# Patient Record
Sex: FEMALE | Race: WHITE | Employment: UNEMPLOYED | URBAN - METROPOLITAN AREA
[De-identification: names, ages, dates, MRNs, and addresses within clinical notes are randomized per-mention and may not be internally consistent; named-entity substitution may affect disease eponyms.]

---

## 2022-11-10 ENCOUNTER — HOSPITAL ENCOUNTER (OUTPATIENT)
Dept: PHYSICAL THERAPY | Age: 37
Discharge: HOME | End: 2022-11-10
Payer: COMMERCIAL

## 2022-11-10 PROCEDURE — 97530 THERAPEUTIC ACTIVITIES: CPT

## 2022-11-10 PROCEDURE — 97161 PT EVAL LOW COMPLEX 20 MIN: CPT

## 2022-11-10 PROCEDURE — 97110 THERAPEUTIC EXERCISES: CPT

## 2022-11-10 PROCEDURE — 97535 SELF CARE MNGMENT TRAINING: CPT

## 2022-11-10 NOTE — PROGRESS NOTES
PF Daily Treatment Note  Patient Name: Alon Feliciano  Date:11/10/2022  [x]  Patient  Verified  Insurance:Payor: Jaison Romero / Plan: Brigitte Santa / Product Type: MERRY /   In time:835am  Out time:925am  Total Treatment Time (min): 50  Visit #: 1 of 8    reatment Area: [x] Pelvic Floor     [] Other:  SUBJECTIVE  Pain Level (0-10 scale): 0  Any medication changes, allergies to medications, adverse drug reactions, diagnosis change, or new procedure performed?: [x] No    [] Yes (see summary sheet for update)  Childbirth or pelvic/urogenital surgical history: childbirth x 4, pregnancy x 5; surrogate to twins and to recent baby included in this; bariatric sleeve present  Urine/feces/gas leakage?: only stress UI sx  Any difficulty with external or internal medical exams?: no    Sexually active?: yes  Pain with initial or deep penetration/before or after intercourse? Sometimes with deep thrust.   Any loss of sensation with sexual health?: no  Leakage mainly with vigorous activity (running prior to pregnancy), jumping, coughing, sneezing, positional changes. Prior to this pregnancy, had some leakage with cough/sneeze prior last pregnancy. Had twins in 2020 both vaginal with one breach and had leakage begin following this. Last vaginal delivery 6 weeks ago 2022 (surrogate). Has two children 15and 6years old. Had minor internal tearing with son and it got infected but healed after that. Bariatric surgery in May of 2017 had bariatric sleeve. 2 DNC's. Pmh 2 knee scopes, has had elbow surgery; no pain in the back or hips; Has period cramping sometimes but hasn't restarted period yet. Has some cervical tenderness with sexual intercourse. Nursing/pumping currently. Some days will have leakage consistently on a daily basis and sometimes it's once a day. Wants to do [employer] exercises which include high impact exercises for weight loss. Will be doing a practice 5K with her daughter next week.      Some perineal soreness following biking. Pelvic Floor Dysfunction Evaluation    Musculoskeletal Screen:    Skin Integrity:  [x] Healthy [] Red  [] Labia Atrophy [] Fragile    Sensation: [x] Intact [] Diminished:    Muscle Bulk: [x] Symmetrical  [] Well-developed [] Atrophied:  []L   []R   []B    Prolapse: No [] Cystocele:   [] Rectocele:    PERF Score (Performance/Endurance/Repetitions/Flicks)   P: 4 E: 10 R: 8 F: 10 Total:    Patient has failed previous pelvic floor muscle training? [] Yes    [x] No    Objective:    Current urinary complaint  leaks with activity (stress induced)  stress incontinence    Bladder complaint longevity:  1 - 3 years    Bladder symptom progression:  worsened    Frequency of UI: 1 times per day    Pad use:  none, chooses not to wear anything but changes clothes    Pad wetness when changed:  wet sometimes eraser-head and sometimes half dollar amount of wetness that will make her want to change underwear. Daytime urinary frequency:  Every 8-12 hour(s) during the day    Nocturia:   0-1x/ night    Patient has failed previous pelvic floor muscle training? [] Yes    [x] No    Pelvic floor MMT  4 - full contraction, bilaterally equal  PERF (Performance/Endurance/Repetitions//Flicks): 3/47/6//36, some problems with coordination of bulge and relax. Pain complaint:  Location:none    Pain scale:  Verbal Analog scale: average daily pain 0, best day 0, worst pain 0    Pelvic floor manual exam: Performed via internal vaginal assessment  Mild to moderate tenderness to palpation of the deep transverse perineal B and the right sided iliococcygeus and obturator internus.     Introitus restriction/TTP (reported as hands on a clock): no    Deep PFM Tenderness/Restriction:     Right Left   pubococcygeus mild  \"feels like tapping\"   Ischiococcygeus      Iliococcygeus Moderate, tapping  pressure   Obturator Internus mild  \"feels like tapping\"   coccygeus      ischiocavernosus     bulbospongiosus     Transverse perineal Moderate tender Moderate tender                      FOTO= 51/100     Global Core Strength/stability  Testing deferred to NV. Hip ROM:   Hip strength: flexion,  ext,  abd,  ER,  IR  Transverse abdominus activation: fair (endurance/bulging/diastasis?)  Functional squat:  DAVID   Hip Thrust   Hip Scour   Supine to sit SIJ test   OBJECTIVE  15 min EVALUATION    10 min Therapeutic Exercise:  [] See flow sheet : Instruction on relaxation diaphragm breathing and on bearing down exercises between kegels. []  Pelvic floor strengthening                 [x]  Pelvic floor downtraining  []  Quality pelvic floor contractions       [x]  Relaxation techniques  []  Urge suppression exercises  []  Other:     15 min Therapeutic Activity:  []  See flow sheet : pt education regarding diaphragm breathing and relaxation breathing, bladder irritants, and effects of high impact activities on leakage. Rationale: improve coordination  to improve the patients ability to perform exercise with reduced leakage. 10 min Self care/home management:  []  See flow sheet : Pt education on exercises that stabilize the pelvic floor versus high impact activities that may aggravate leakage sx. Discussed exercise routines, biking, and exercise videos. Advised use of [employer] to reduce pain with sexual intercourse. Rationale: increase strength and improve coordination  to improve the patients ability to manage ADLs with reduced leakage. min Patient Education: [x] Review HEP    [] Progressed/Changed HEP based on:   [] positioning   [] body mechanics   [] transfers   [] heat/ice application        Pain Level (0-10 scale) post treatment: 0    ASSESSMENT/Changes in Function: Pt is a 15-year-old woman presenting to the clinic with c/o stress UI occurring with vigorous activity, cough, sneeze, laugh, and at times with positional changes.  This leakage is inconsistent, with some weeks having daily leakage and other weeks having days without leakage. Pt is an active individual and would like to resume weight loss exercise videos that include high impact activities. She also participates in cycling, which causes discomfort along her pelvic floor and perineum. Pt is 6 weeks post-partum and has been sexually active without complications since 4 days post-partum. She has some pain with sexual intercourse with deep penetration. Pt scored 4/10/8//10 on PERF indicating dec pelvic floor mm strength and endurance, with some difficulty noted with coordination of ava versus bearing down. Patient scored 51 on FOTO indicating 49% functional impairment. Patient can benefit from PT to increase pelvic floor muscle strength, decrease urinary urgency and incontinence, as well as improve hip/core strength, ROM, and dynamic stability for improved functional mobility and QOL. Treatment may include, but is not limited to, therapeutic exercise, therapeutic activities, neuromuscular re-education, manual therapy, and modalities as indicated including biofeedback at therapist discretion. Patient will continue to benefit from skilled PT services to modify and progress therapeutic interventions, address functional mobility deficits, address ROM deficits, address strength deficits, analyze and address soft tissue restrictions, analyze and cue movement patterns, analyze and modify body mechanics/ergonomics, assess and modify postural abnormalities, address imbalance/dizziness, and instruct in home and community integration to attain remaining goals. [x]  See Plan of Care    Progress towards goals / Updated goals:  Short Term Goals: To be accomplished in 1 weeks:  1. Patient will perform pelvic floor exercises 5x/day to maximize therapeutic outcomes. Eval: issued and reviewed  Long Term Goals: To be accomplished in 8-12 treatments:  Patient will demonstrate 4/10/10/10 on PERF for improved pelvic floor strength and to reduce leakage. Eval: 4/10/8//10  2.  Patient will report independence with HEP to maximize therapeutic outcomes. Eval: issued and reviewed  3. Patient will report 50% improvement in urinary incontinence to reduce discomfort with ADLs. Eval: n/a  4. Pt will reduce incontinence pad usage to 1 pantyliner or less per day to improve comfort with daily routine. Eval: sometimes does not wear one; sometimes changes underwear  5. Pt will participate in 30 minute HIIT class without leakage, indicating improved pelvic floor endurance for improved comfort with activity. Eval: leakage with activity. PLAN  []  Upgrade activities as tolerated     [x]  Continue plan of care  []  Update interventions per flow sheet       []  Discharge due to:_  []  Other:_      NEXT VISIT: assess hip strength, mobility, and flexibility. Assign perineal stretching internally and externally.      Christine Deluca, GRACIA 11/10/2022  8:35 AM

## 2022-11-10 NOTE — PROGRESS NOTES
[facility]  [place] [address], [address]  [phone] fax    Plan of Care/ Statement of Necessity for Physical Therapy Services    Patient name: Ki Henry Start of Care: 11/10/2022   Referral source: Melinda Mayen, Malorie Fuller : 1985    Medical Diagnosis: Pelvic floor dysfunction [M62.89]  Payor: OPTIMA / Plan: Chapo Deluca / Product Type: MERRY /  Onset Date:2 years    Treatment Diagnosis: stress UI   Prior Hospitalization: see medical history Provider#: [id number]   Medications: Verified on Patient summary List    Comorbidities: allergies, arthritis, BMI > 30, GI dysfunction   Prior Level of Function: had mild stress UI following birth of twins in , no UI prior to this           The Plan of Care and following information is based on the information from the initial evaluation. Assessment/ key information: Pt is a 69-year-old woman presenting to the clinic with c/o stress UI occurring with vigorous activity, cough, sneeze, laugh, and at times with positional changes. This leakage is inconsistent, with some weeks having daily leakage and other weeks having days without leakage. Pt is an active individual and would like to resume weight loss exercise videos that include high impact activities. She also participates in cycling, which causes discomfort along her pelvic floor and perineum. Pt is 6 weeks post-partum and has been sexually active without complications since 4 days post-partum. She has some pain with sexual intercourse with deep penetration. Pt scored 4/10/8//10 on PERF indicating dec pelvic floor mm strength and endurance, with some difficulty noted with coordination of ava versus bearing down. Patient scored 51 on FOTO indicating 49% functional impairment.   Patient can benefit from PT to increase pelvic floor muscle strength, decrease urinary urgency and incontinence, as well as improve hip/core strength, ROM, and dynamic stability for improved functional mobility and QOL. Treatment may include, but is not limited to, therapeutic exercise, therapeutic activities, neuromuscular re-education, manual therapy, and modalities as indicated including biofeedback at therapist discretion. Evaluation Complexity History MEDIUM  Complexity : 1-2 comorbidities / personal factors will impact the outcome/ POC ; Examination MEDIUM Complexity : 3 Standardized tests and measures addressing body structure, function, activity limitation and / or participation in recreation  ;Presentation LOW Complexity : Stable, uncomplicated  ;Clinical Decision Making MEDIUM Complexity : FOTO score of 26-74  Overall Complexity Rating: LOW     Problem List: Pelvic pain/dysfunction, Decreased pelvic floor mm awareness, Decreased pelvic floor mm strength, Use of accessory muscles, Hypertonus of pelvic floor, and Other stress UI    Treatment Plan may include any combination of the following:   Therapeutic exercise, Neuromuscular re-education, Manual therapy, Physical agent/modality, Patient education, and Other therapeutic activities, self care/home management  Patient / Family readiness to learn indicated by: asking questions, trying to perform skills, and interest    Persons(s) to be included in education: patient (P)    Barriers to Learning/Limitations: None    Patient Goal (s): return to exercise; no leaking    Patient Self Reported Health Status: good    Rehabilitation Potential: good    Short Term Goals: To be accomplished in 1 weeks:  1. Patient will perform pelvic floor exercises 5x/day to maximize therapeutic outcomes. Eval: issued and reviewed  Long Term Goals: To be accomplished in 8-12 treatments:  Patient will demonstrate 4/10/10/10 on PERF for improved pelvic floor strength and to reduce leakage. Eval: 4/10/8//10  2. Patient will report independence with HEP to maximize therapeutic outcomes. Eval: issued and reviewed  3.  Patient will report 50% improvement in urinary incontinence to reduce discomfort with ADLs. Eval: n/a  4. Pt will reduce incontinence pad usage to 1 pantyliner or less per day to improve comfort with daily routine. Eval: sometimes does not wear one; sometimes changes underwear  5. Pt will participate in 30 minute HIIT class without leakage, indicating improved pelvic floor endurance for improved comfort with activity. Eval: leakage with activity. Frequency / Duration: Patient to be seen 1 times per week for 8 weeks. Patient/ Caregiver education and instruction: Diagnosis, prognosis, Proper Voiding Habits, Diet, Pain Management, Exercises, and Bladder Retraining      Darrylegretel George, PT 11/10/2022 11:24 AM    ________________________________________________________________________    I certify that the above Therapy Services are being furnished while the patient is under my care. I agree with the treatment plan and certify that this therapy is necessary.     [de-identified] Signature:____________Date:_________TIME:________     LINDA Mora  ** Signature, Date and Time must be completed for valid certification **      Please sign and return to [facility] [address] [place] [address] & [facility]  [address]  [place], [facility] [address]  [phone] fax

## 2022-12-06 ENCOUNTER — HOSPITAL ENCOUNTER (OUTPATIENT)
Dept: PHYSICAL THERAPY | Age: 37
Discharge: HOME | End: 2022-12-06
Payer: COMMERCIAL

## 2022-12-06 PROCEDURE — 97110 THERAPEUTIC EXERCISES: CPT

## 2022-12-06 PROCEDURE — 97112 NEUROMUSCULAR REEDUCATION: CPT

## 2022-12-06 NOTE — PROGRESS NOTES
PF DAILY TREATMENT NOTE [facility] [address]    Patient Name: Daniel Cruz  Date:2022  : 1985  [x]  Patient  Verified  Payor: Darius Owens / Plan: Sun Canales / Product Type: MERRY /    In time:215pm  Out time:302pm  Total Treatment Time (min): 52    Visit #: 2 of 8    Treatment Area: [x] Pelvic Floor     [] Other:    SUBJECTIVE  Pain Level (0-10 scale): 0  Any medication changes, allergies to medications, adverse drug reactions, diagnosis change, or new procedure performed?: [x] No    [] Yes (see summary sheet for update)  Subjective functional status/changes:   [] No changes reported  Reports initially she was doing very well with the breathing and kegels but recently has been busier and forgetting. OBJECTIVE     24 min Therapeutic Exercise:  [] See flow sheet :   [x]  Pelvic floor strengthening                 []  Pelvic floor downtraining  []  Quality pelvic floor contractions       [x]  Relaxation techniques  []  Urge suppression exercises  [x]  Other: abdominal and LE strengthening  Rationale: increase ROM, increase strength, and improve coordination  to improve the patients ability to manage ADLs with improved ease and reduced leakage. 23 min Neuromuscular Re-education:  []  See flow sheet :   [x]  Pelvic floor strengthening                 []  Pelvic floor downtraining  [x]  Quality pelvic floor contractions       []  Relaxation techniques  []  Urge suppression exercises  [x]  Other: abdominal stability activities -- dead bugs, bird dog  Rationale: increase strength and improve coordination  to improve the patients ability to manage ADLs with improved intra-abdominal pressure and reduced leakage.            With   [] TE   [] TA   [] neuro  [] manual   [] other: Patient Education: [x] Review HEP    [] Progressed/Changed HEP based on:   [] positioning   [] body mechanics   [] transfers   [] heat/ice application    [] other:      Other Objective/Functional Measures:   See PN    Pain Level (0-10 scale) post treatment: 0    ASSESSMENT/Changes in Function: see PN    []  Decrease # of leaks   [] No change []  Improving [] Resolved     []  Decrease hypertonus [] No change []  Improving [] Resolved     []  Increase void interval [] No change []  Improving [] Resolved     []  Increase PF strength [] No change []  Improving [] Resolved     []  Increase PF endurance [] No change []  Improving [] Resolved     []  Increase endurance [] No change []  Improving [] Resolved     []  Decrease # of pads [] No change []  Improving [] Resolved     []  Decrease pain [] No change []  Improving [] Resolved     []  Increased coordination [] No change []  Improving [] Resolved     []  Increased Bowel Frequency [] No change []  Improving [] Resolved       Patient will continue to benefit from skilled PT services to modify and progress therapeutic interventions, address functional mobility deficits, address ROM deficits, address strength deficits, analyze and address soft tissue restrictions, analyze and cue movement patterns, analyze and modify body mechanics/ergonomics, and assess and modify postural abnormalities to attain remaining goals. []  See Plan of Care  [x]  See progress note/recertification  []  See Discharge Summary         Progress towards goals / Updated goals:  Short Term Goals: To be accomplished in 1 weeks:  1. Patient will perform pelvic floor exercises 5x/day to maximize therapeutic outcomes. Eval: issued and reviewed  Current: typically 1-2 times a day, having trouble relaxing (12/6/2022)  Long Term Goals: To be accomplished in 8-12 treatments:  Patient will demonstrate 4/10/10/10 on PERF for improved pelvic floor strength and to reduce leakage. Eval: 4/10/8//10  Current: test next visit, 4/10/8//10 at evaluation (12/6/2022)  2. Patient will report independence with HEP to maximize therapeutic outcomes. Eval: issued and reviewed  Current: irregularly performed due to stress and childcare (12/6/2022)  3.  Patient will report 50% improvement in urinary incontinence to reduce discomfort with ADLs. Eval: n/a   Current: initially improved, recently has regressed (12/6/2022)  4. Pt will reduce incontinence pad usage to 1 pantyliner or less per day to improve comfort with daily routine. Eval: sometimes does not wear one; sometimes changes underwear   Current: change underwear instead, but not daily as before. (12/6/2022)  5. Pt will participate in 30 minute HIIT class without leakage, indicating improved pelvic floor endurance for improved comfort with activity. Eval: leakage with activity. Current: had started doing core classes but got COVID and has not yet been back.  (12/6/2022)     PLAN  []  Upgrade activities as tolerated     [x]  Continue plan of care  []  Update interventions per flow sheet       []  Discharge due to:_  []  Other:_      Jhoana Centeno, PT 12/6/2022  2:18 PM    Future Appointments   Date Time Provider Stefan Rubalcava   12/15/2022 10:00 AM Rozelle Perch, PT [facility]   12/20/2022  9:00 AM Rozelle Perch, PT [facility]   1/3/2023 10:00 AM Rozelle Perch, PT [facility]   1/10/2023 10:00 AM Rozelle Perch, PT [facility]   1/20/2023  2:30 PM Rozelle Perch, PT [facility]   1/24/2023 10:00 AM Rozelle Perch, PT [facility]   1/31/2023 10:00 AM Rozelle Perch, PT [facility]

## 2022-12-09 NOTE — PROGRESS NOTES
[facility]  Jose Angel knapp, [address]  [phone] fax    Pelvic Floor Progress Note  Patient name: Fredy Atwood Start of Care: 11/10/2022   Referral source: LINDA Carrera : 1985   Medical/Treatment Diagnosis: Pelvic floor dysfunction [M62.89]  Payor: OPTIMA / Plan: Vimal Carbo / Product Type: MERRY /  Onset Date:2 years     Prior Hospitalization: see medical history Provider#: [id number]   Medications: Verified on Patient Summary List    Comorbidities: allergies, arthritis, BMI > 30, GI dysfunction   Prior Level of Function: had mild stress UI following birth of twins in , no UI prior to this                                Visits from Start of Care: 2    Missed Visits: 1    Established Goals:             Short Term Goals: To be accomplished in 1 weeks:  1. Patient will perform pelvic floor exercises 5x/day to maximize therapeutic outcomes. Eval: issued and reviewed  Current: typically 1-2 times a day, having trouble relaxing   Long Term Goals: To be accomplished in 8-12 treatments:  Patient will demonstrate 4/10/10/10 on PERF for improved pelvic floor strength and to reduce leakage. Eval: 4/10/8//10  Current: test next visit, 4/10/8//10 at evaluation   2. Patient will report independence with HEP to maximize therapeutic outcomes. Eval: issued and reviewed  Current: irregularly performed due to stress and childcare   3. Patient will report 50% improvement in urinary incontinence to reduce discomfort with ADLs. Eval: n/a              Current: initially improved, recently has regressed   4. Pt will reduce incontinence pad usage to 1 pantyliner or less per day to improve comfort with daily routine. Eval: sometimes does not wear one; sometimes changes underwear              Current: change underwear instead, but not daily as before.    5. Pt will participate in 30 minute HIIT class without leakage, indicating improved pelvic floor endurance for improved comfort with activity. Eval: leakage with activity. Current: had started doing core classes but got COVID and has not yet been back. Key Functional Changes: limited; 1 follow-up only    Updated Goals: to be achieved in 4 weeks:  Patient will demonstrate 4/10/10/10 on PERF for improved pelvic floor strength and to reduce leakage. PN: test next visit, 4/10/8//10 at evaluation   2. Patient will report independence with HEP to maximize therapeutic outcomes. PN: irregularly performed due to stress and childcare   3. Patient will report 50% improvement in urinary incontinence to reduce discomfort with ADLs. PN: initially improved, recently has regressed   4. Pt will reduce incontinence pad usage to 1 pantyliner or less per day to improve comfort with daily routine. PN: change underwear instead, but not daily as before. 5. Pt will participate in 30 minute HIIT class without leakage, indicating improved pelvic floor endurance for improved comfort with activity. PN: had started doing core classes but got COVID and has not yet been back. ASSESSMENT/RECOMMENDATIONS:  Pt has returned for her first follow-up since her [facility] and re-assessment. She has been performing her kegel exercises and working on lifestyle changes such as taking a break from high impact exercises. She has been noticing some positive improvement in leakage that varies by the week. Recommend continued PFPT to increase pelvic floor muscle strength, decrease urinary urgency and incontinence, as well as improve hip/core strength, ROM, and dynamic stability for improved functional mobility and QOL.   [x]Continue therapy per initial plan/protocol at a frequency of  1 x per week for 4-8 weeks  []Continue therapy with the following recommended changes:_____________________ _____________________________________________________________________  []Discontinue therapy progressing towards or have reached established goals  []Discontinue therapy due to lack of appreciable progress towards goals  []Discontinue therapy due to lack of attendance or compliance  []Await Physician's recommendations/decisions regarding therapy  []Other:________________________________________________________________    Thank you for this referral.   Alexus Garcia, PT 12/9/2022 3:51 PM    NOTE TO PHYSICIAN:  PLEASE COMPLETE THE ORDERS BELOW AND  FAX TO [facility]: [phone]  If you are unable to process this request in 24 hours please contact our office: [phone]    I have read the above report and request that my patient continue as recommended. I have read the above report and request that my patient continue therapy with the following changes/special instructions:___________________________________________________________  I have read the above report and request that my patient be discharged from therapy.     [de-identified] Signature:____________Date:_________TIME:________     LINDA Chanel  ** Signature, Date and Time must be completed for valid certification **

## 2022-12-15 ENCOUNTER — HOSPITAL ENCOUNTER (OUTPATIENT)
Dept: PHYSICAL THERAPY | Age: 37
Discharge: HOME | End: 2022-12-15
Payer: COMMERCIAL

## 2022-12-15 PROCEDURE — 97112 NEUROMUSCULAR REEDUCATION: CPT

## 2022-12-15 PROCEDURE — 97110 THERAPEUTIC EXERCISES: CPT

## 2022-12-15 PROCEDURE — 97530 THERAPEUTIC ACTIVITIES: CPT

## 2022-12-15 NOTE — PROGRESS NOTES
PF DAILY TREATMENT NOTE [facility] 3-16    Patient Name: Denver Lepe  Date:12/15/2022  : 1985  [x]  Patient  Verified  Payor: Twan Davison / Plan: Sun Canales / Product Type: MERRY /    In time:10am  Out time:11am  Total Treatment Time (min): 60    Visit #: 1 of 4-8    Treatment Area: [x] Pelvic Floor     [] Other:    SUBJECTIVE  Pain Level (0-10 scale): 0  Any medication changes, allergies to medications, adverse drug reactions, diagnosis change, or new procedure performed?: [x] No    [] Yes (see summary sheet for update)  Subjective functional status/changes:   [] No changes reported  [place] are going [employer] tanisha doesn't use sound notifications which makes it harder to track. Exercises a little more regularly. Leakage occurring after toileting when washing hands ~ 1 drop feeling like \"air bubble. \"    OBJECTIVE     25 min Therapeutic Exercise:  [] See flow sheet :   [x]  Pelvic floor strengthening                 []  Pelvic floor downtraining  [x]  Quality pelvic floor contractions       []  Relaxation techniques  [x]  Urge suppression exercises  [x]  Other: stress UI pressure management in standing, sitting, squatting, QP  Rationale: increase strength and improve coordination  to improve the patients ability to perform ADLs with reduced leakage. 10 min Therapeutic Activity:  []  See flow sheet : training for toileting with full empty, included pressure to lower abdomen, leaning forward, pelvic rocking and rotating, and full STS followed by sit and deep breath. [x]  Increase Tissue extensibility        []  Assess fiber intake    [x]  Assess voiding habits  []  Assess bowel habits  []  Other:   Rationale: improve coordination  to improve the patients ability to toilet with reduced leakage when washing her hands.      25 min Neuromuscular Re-education:  [x]  See flow sheet : reformer core strengthening, pelvic tilt marching and dead bugs   [x]  Pelvic floor strengthening                 []  Pelvic floor downtraining  []  Quality pelvic floor contractions       []  Relaxation techniques  []  Urge suppression exercises  [x]  Other: abdominal strengthening. Rationale: increase strength, improve coordination, and increase proprioception  to improve the patients ability to perform ADLs with reduced leakage with activity. With   [] TE   [] TA   [] neuro  [] manual   [] other: Patient Education: [x] Review HEP    [] Progressed/Changed HEP based on:   [] positioning   [] body mechanics   [] transfers   [] heat/ice application    [] other:      Other Objective/Functional Measures:   Exercises per flowsheet    Pain Level (0-10 scale) post treatment: 0    ASSESSMENT/Changes in Function: Session focused on anterior strengthening and pressure management with kegels, working on breathing and kegels. Plan to emphasize exhale prior to STS and exhale prior to squat with kegel to promote improved pelvic floor coordination. Progressed HEP to include more core exercises upon pt request. Tactile cues provided at ASIS to promote TrA draw in versus rectus/oblique bracing.     []  Decrease # of leaks   [] No change []  Improving [] Resolved     []  Decrease hypertonus [] No change []  Improving [] Resolved     []  Increase void interval [] No change []  Improving [] Resolved     []  Increase PF strength [] No change []  Improving [] Resolved     []  Increase PF endurance [] No change []  Improving [] Resolved     []  Increase endurance [] No change []  Improving [] Resolved     []  Decrease # of pads [] No change []  Improving [] Resolved     []  Decrease pain [] No change []  Improving [] Resolved     []  Increased coordination [] No change []  Improving [] Resolved     []  Increased Bowel Frequency [] No change []  Improving [] Resolved       Patient will continue to benefit from skilled PT services to modify and progress therapeutic interventions, address functional mobility deficits, address ROM deficits, address strength deficits, analyze and address soft tissue restrictions, analyze and cue movement patterns, analyze and modify body mechanics/ergonomics, assess and modify postural abnormalities, address imbalance/dizziness, and instruct in home and community integration to attain remaining goals. []  See Plan of Care  []  See progress note/recertification  []  See Discharge Summary         Progress towards goals / Updated goals:  Patient will demonstrate 4/10/10/10 on PERF for improved pelvic floor strength and to reduce leakage. PN: test next visit, 4/10/8//10 at evaluation   2. Patient will report independence with HEP to maximize therapeutic outcomes. PN: irregularly performed due to stress and childcare   3. Patient will report 50% improvement in urinary incontinence to reduce discomfort with ADLs. PN: initially improved, recently has regressed    Current: cough had leakage, and one instance of leakage 1 minute after using the bathroom. (12/15/2022)  4. Pt will reduce incontinence pad usage to 1 pantyliner or less per day to improve comfort with daily routine. PN: change underwear instead, but not daily as before. 5. Pt will participate in 30 minute HIIT class without leakage, indicating improved pelvic floor endurance for improved comfort with activity. PN: had started doing core classes but got COVID and has not yet been back.     PLAN  []  Upgrade activities as tolerated     [x]  Continue plan of care  []  Update interventions per flow sheet       []  Discharge due to:_  []  Other:_      Kelin Toscano, PT 12/15/2022  10:03 AM    Future Appointments   Date Time Provider Stefan Rubalcava   12/20/2022  9:00 AM Sonam Mercado, PT [facility]   1/3/2023 10:00 AM Sonam Mercado, PT [facility]   1/10/2023 10:00 AM Sonamfreeman Mercado, PT [facility]   1/20/2023  2:30 PM Sonam Mercado, PT [facility]   1/24/2023 10:00 AM Sonam Mercado, PT [facility]   1/31/2023 10:00 AM Vitor Edwards, PT [facility]

## 2022-12-20 ENCOUNTER — HOSPITAL ENCOUNTER (OUTPATIENT)
Dept: PHYSICAL THERAPY | Age: 37
Discharge: HOME | End: 2022-12-20
Payer: COMMERCIAL

## 2022-12-20 PROCEDURE — 97140 MANUAL THERAPY 1/> REGIONS: CPT

## 2022-12-20 PROCEDURE — 97530 THERAPEUTIC ACTIVITIES: CPT

## 2022-12-20 PROCEDURE — 97110 THERAPEUTIC EXERCISES: CPT

## 2022-12-20 PROCEDURE — 97112 NEUROMUSCULAR REEDUCATION: CPT

## 2022-12-20 NOTE — PROGRESS NOTES
PF DAILY TREATMENT NOTE [facility] 3-16    Patient Name: Kathe Cho  Date:2022  : 1985  [x]  Patient  Verified  Payor: Yayo Hanson / Plan: Mara Ireneerejojo / Product Type: MERRY /    In time:901am  Out time:1003am  Total Treatment Time (min): 62    Visit #: 2 of 4-8    Treatment Area: [x] Pelvic Floor     [] Other:    SUBJECTIVE  Pain Level (0-10 scale): 0  Any medication changes, allergies to medications, adverse drug reactions, diagnosis change, or new procedure performed?: [x] No    [] Yes (see summary sheet for update)  Subjective functional status/changes:   [] No changes reported  Feels like she has been doing the kegels more consistently. Sometimes forgets to kegel after standing from toilet. OBJECTIVE     10 min Therapeutic Exercise:  [] See flow sheet : dead bugs, bird dogs, stretching. []  Pelvic floor strengthening                 []  Pelvic floor downtraining  []  Quality pelvic floor contractions       []  Relaxation techniques  []  Urge suppression exercises  [x]  Other: TrA strengthening, stretching  Rationale: increase ROM and increase strength  to improve the patients ability to perform ADLs with improved ease and reduced leakage. 10 min Therapeutic Activity:  []  See flow sheet : working on intraabdominal pressure with exhale in combination with stabilization transfers such as STS, squat as well as with hip ABD activities. Suitcase carries 120ft B    []  Increase Tissue extensibility        []  Assess fiber intake    []  Assess voiding habits  []  Assess bowel habits  []  Other:   Rationale: increase strength and improve coordination  to improve the patients ability to toilet without leakage upon standing.        27 min Neuromuscular Re-education:  []  See flow sheet : reformer pilates based core strengthening   [x]  Pelvic floor strengthening                 []  Pelvic floor downtraining  []  Quality pelvic floor contractions       []  Relaxation techniques  []  Urge suppression exercises  [x]  Other: TrA strengthening  Rationale: increase strength and improve coordination  to improve the patients ability to participate in exercise classes with improved abdominal and pelvic stabilization. 15 min Manual Therapy:  TrPR to the right obturator internus and iliococcygeus. Soft tissue assessment of compressor urethrae B with slight weakness of the right compared to left on palpation   Rationale: decrease pain, increase ROM, and increase tissue extensibility to improve ease of managing ADLs with reduced weakness. With   [] TE   [] TA   [] neuro  [] manual   [] other: Patient Education: [x] Review HEP    [] Progressed/Changed HEP based on:   [] positioning   [] body mechanics   [] transfers   [] heat/ice application    [] other:      Other Objective/Functional Measures:   PERFECT 4/10/10//10  30 deg of hip IR on the right LE and 40 deg on the left LE. Pain Level (0-10 scale) post treatment: 0    ASSESSMENT/Changes in Function: Pt demonstrates improving pelvic floor strength and reports reduced leakage since starting PF PT. She has been intermittently experiencing stress UI sx following transfers off the toilet to the sink and she been working on pressure management in therapy to improve this. Therapy is progressing PF strengthening to pilates based exercises and standing step up exercises to begin transitioning pt towards improved control with high intensity exercise and to control leakage. Recommend continued PT to reduce UI sx.     [x]  Decrease # of leaks   [] No change [x]  Improving [] Resolved     []  Decrease hypertonus [] No change []  Improving [] Resolved     []  Increase void interval [] No change []  Improving [] Resolved     [x]  Increase PF strength [] No change [x]  Improving [] Resolved     [x]  Increase PF endurance [] No change [x]  Improving [] Resolved     []  Increase endurance [] No change []  Improving [] Resolved     []  Decrease # of pads [] No change [] Improving [] Resolved     []  Decrease pain [] No change []  Improving [] Resolved     [x]  Increased coordination [] No change [x]  Improving [] Resolved     []  Increased Bowel Frequency [] No change []  Improving [] Resolved       Patient will continue to benefit from skilled PT services to modify and progress therapeutic interventions, address functional mobility deficits, address ROM deficits, address strength deficits, analyze and address soft tissue restrictions, analyze and cue movement patterns, analyze and modify body mechanics/ergonomics, assess and modify postural abnormalities, address imbalance/dizziness, and instruct in home and community integration to attain remaining goals. []  See Plan of Care  []  See progress note/recertification  []  See Discharge Summary         Progress towards goals / Updated goals:  Patient will demonstrate 4/10/10/10 on PERF for improved pelvic floor strength and to reduce leakage. PN: test next visit, 4/10/8//10 at evaluation   Current: met, 4/10/10//10 (12/20/2022)  2. Patient will report independence with HEP to maximize therapeutic outcomes. PN: irregularly performed due to stress and childcare   3. Patient will report 50% improvement in urinary incontinence to reduce discomfort with ADLs. PN: initially improved, recently has regressed               Current: cough had leakage, and one instance of leakage 1 minute after using the bathroom. (12/15/2022)  4. Pt will reduce incontinence pad usage to 1 pantyliner or less per day to improve comfort with daily routine. PN: change underwear instead, but not daily as before. 5. Pt will participate in 30 minute HIIT class without leakage, indicating improved pelvic floor endurance for improved comfort with activity. PN: had started doing core classes but got COVID and has not yet been back.        HEP UPDATED TO INCLUDE:  Access Code: [email or web address]  URL: [email or web address]  Date: 12/20/2022  Prepared by: Hiram Persaud    Exercises  Supine Dead Bug with Leg Extension - 2 x daily - 7 x weekly - 1 sets - 10 reps  Quadruped Pelvic Floor Contraction with Opposite Arm and Leg Lift - 2 x daily - 7 x weekly - 1 sets - 10 reps  Sit to Stand with Pelvic Floor Contraction - 2 x daily - 7 x weekly - 1 sets - 10 reps  Supine Piriformis Stretch with Foot on Ground - 3 x daily - 7 x weekly - 2 reps - 30 sec hold  Standing Quadratus Lumborum Stretch with Doorway - 2 x daily - 7 x weekly - 2 reps - 30 sec hold  Runner's Step Up on BOSU® Ball - 1 x daily - 3 x weekly - 1-2 sets - 10 reps  Lateral Step Up and Overs on BOSU - 1 x daily - 3 x weekly - 1-2 sets - 10 reps    PLAN  []  Upgrade activities as tolerated     [x]  Continue plan of care  []  Update interventions per flow sheet       []  Discharge due to:_  []  Other:_      Hiram Persaud, PT 12/20/2022  9:01 AM    Future Appointments   Date Time Provider Stefan Rubalcava   1/3/2023 10:00 AM Júnior Arvizu PT [facility]   1/10/2023 10:00 AM Júnior Arvizu, PT [facility]   1/20/2023  2:30 PM Júnior Arvizu PT [facility]   1/24/2023 10:00 AM Júnior Arvizu PT [facility]   1/31/2023 10:00 AM Júnior Arvizu, GRACIA [facility]

## 2022-12-20 NOTE — PROGRESS NOTES
[facility]  Jose Angel knapp, [address]  [phone] fax    Pelvic Floor Progress Note  Patient name: Alon Feliciano Start of Care: 11/10/2022   Referral source: LINDA Victor : 1985   Medical/Treatment Diagnosis: Pelvic floor dysfunction [M62.89]  Payor: OLGA / Plan: Brigitte Santa / Product Type: MERRY /  Onset Date:2 years     Prior Hospitalization: see medical history Provider#: [id number]   Medications: Verified on Patient Summary List    Comorbidities:  allergies, arthritis, BMI > 30, GI dysfunction   Prior Level of Function: had mild stress UI following birth of twins in , no UI prior to this                               Visits from Start of Care: 4    Missed Visits: 0    Established Goals:           Excellent Good         Limited           None  [] Increase Pelvic MM strength []  [x]  []  []  [] Decrease Pelvic MM hypertonus []  [x]  []  []  [] Decrease Incontinence Episodes []  [x]  []  []   [] Improve Voiding Habits  []  [x]  []  []  [] Decreased Urgency   []  [x]  []  []    Key Functional Changes: PERFECT 4/10/10//10  Updated Goals: to be achieved in 8 weeks:  Patient will report independence with HEP to maximize therapeutic outcomes. PN: reports improving compliance  2. Patient will report 50% improvement in urinary incontinence to reduce discomfort with ADLs. PN: cough had leakage, and one instance of leakage 1 minute after using the bathroom. 3.  Pt will report not needing to change her underwear due to leakage to improve comfort with daily routine. PN: changes underwear with stress UI  4. Pt will participate in 30 minute HIIT class without leakage, indicating improved pelvic floor endurance for improved comfort with activity.                 PN: had started doing core classes    ASSESSMENT/RECOMMENDATIONS:  Pt demonstrates improving pelvic floor strength and reports reduced leakage since starting PF PT. She has been intermittently experiencing stress UI sx following transfers off the toilet to the sink and she been working on pressure management in therapy to improve this. Therapy is progressing PF strengthening to pilates based exercises and standing step up exercises to begin transitioning pt towards improved control with high intensity exercise and to control leakage. Recommend continued PT to reduce UI sx. [x]Continue therapy per initial plan/protocol at a frequency of  1 x per week for 8 weeks  []Continue therapy with the following recommended changes:_____________________      _____________________________________________________________________  []Discontinue therapy progressing towards or have reached established goals  []Discontinue therapy due to lack of appreciable progress towards goals  []Discontinue therapy due to lack of attendance or compliance  []Await Physician's recommendations/decisions regarding therapy  []Other:________________________________________________________________    Thank you for this referral.   Irene Galan, PT 12/20/2022 12:51 PM    NOTE TO PHYSICIAN:  PLEASE COMPLETE THE ORDERS BELOW AND  FAX TO [facility]: [phone]  If you are unable to process this request in 24 hours please contact our office: [phone]    I have read the above report and request that my patient continue as recommended. I have read the above report and request that my patient continue therapy with the following changes/special instructions:___________________________________________________________  I have read the above report and request that my patient be discharged from therapy.     [de-identified] Signature:____________Date:_________TIME:________     LINDA Schreiber  ** Signature, Date and Time must be completed for valid certification **

## 2023-01-03 ENCOUNTER — APPOINTMENT (OUTPATIENT)
Dept: PHYSICAL THERAPY | Age: 38
End: 2023-01-03
Payer: OTHER GOVERNMENT

## 2023-01-10 ENCOUNTER — HOSPITAL ENCOUNTER (OUTPATIENT)
Dept: PHYSICAL THERAPY | Age: 38
Discharge: HOME | End: 2023-01-10
Payer: OTHER GOVERNMENT

## 2023-01-10 PROCEDURE — 97112 NEUROMUSCULAR REEDUCATION: CPT

## 2023-01-10 PROCEDURE — 97530 THERAPEUTIC ACTIVITIES: CPT

## 2023-01-10 PROCEDURE — 97110 THERAPEUTIC EXERCISES: CPT

## 2023-01-10 NOTE — PROGRESS NOTES
PT DAILY TREATMENT NOTE     Patient Name: Sunday Valles  Date:1/10/2023  : 1985  [x]  Patient  Verified  Payor: Arielle Erp / Plan: Real Zuniga / Product Type: MERRY /    In time:10AM  Out time:1050am  Total Treatment Time (min): 50  Visit #: 1 of 8    Treatment Area: Pelvic floor dysfunction [M62.89]    SUBJECTIVE  Pain Level (0-10 scale): 0  Any medication changes, allergies to medications, adverse drug reactions, diagnosis change, or new procedure performed?: [x] No    [] Yes (see summary sheet for update)  Subjective functional status/changes:   [] No changes reported  Some days no leakage, other days a few drops of leakage. Yesterday, she had gone to the bathroom a couple minutes prior and then \"felt the bubble\" when leaning down to pick something up when she experienced the leakage. Performed a body pump class yesterday with exercises and feeling sore today. OBJECTIVE     15 min Therapeutic Exercise:  [] See flow sheet : dead bugs, bird dogs, stretching, S/L hip abd and circles, QP leg extensions   []  Pelvic floor strengthening                 []  Pelvic floor downtraining  []  Quality pelvic floor contractions       []  Relaxation techniques  []  Urge suppression exercises  [x]  Other: TrA strengthening, stretching  Rationale: increase ROM and increase strength  to improve the patients ability to perform ADLs with improved ease and reduced leakage. 10 min Therapeutic Activity:  []  See flow sheet : working on intraabdominal pressure with exhale in combination with stabilization transfers such as STS, squat as well as with hip ABD activities. Suitcase carries 120ft B    []  Increase Tissue extensibility        []  Assess fiber intake    []  Assess voiding habits                  []  Assess bowel habits  []  Other:          Rationale: increase strength and improve coordination  to improve the patients ability to toilet without leakage upon standing.           25 min Neuromuscular Re-education:  []  See flow sheet : reformer pilates based core strengthening, [place] core strengthening, dead lifts,   [x]  Pelvic floor strengthening                 []  Pelvic floor downtraining  []  Quality pelvic floor contractions       []  Relaxation techniques  []  Urge suppression exercises  [x]  Other: TrA strengthening  Rationale: increase strength and improve coordination  to improve the patients ability to participate in exercise classes with improved abdominal and pelvic stabilization. With   [] TE   [] TA   [] neuro   [] other: Patient Education: [x] Review HEP    [] Progressed/Changed HEP based on:   [] positioning   [] body mechanics   [] transfers   [] heat/ice application    [] other:       Other Objective/Functional Measures: added QP leg extensions and Nancy lifts     Pain Level (0-10 scale) post treatment: 0    ASSESSMENT/Changes in Function: Pt performs all exercises as directed, needing assistance to coordinate exhale with kegel. Incorporated core strengthening activities on [employer]. Educated pt to not overdo the exercises and allow time for body to rest when asked for more exercises. Patient will continue to benefit from skilled PT services to modify and progress therapeutic interventions, address functional mobility deficits, address ROM deficits, address strength deficits, analyze and address soft tissue restrictions, analyze and cue movement patterns, analyze and modify body mechanics/ergonomics, assess and modify postural abnormalities, address imbalance/dizziness, and instruct in home and community integration to attain remaining goals. []  See Plan of Care  []  See progress note/recertification  []  See Discharge Summary         Progress towards goals / Updated goals:  Patient will report independence with HEP to maximize therapeutic outcomes. PN: reports improving compliance   Current: performing kegels semi-regularly and HEP less regularly. (1/10/2023)  2. Patient will report 50% improvement in urinary incontinence to reduce discomfort with ADLs. PN: cough had leakage, and one instance of leakage 1 minute after using the bathroom. 3.  Pt will report not needing to change her underwear due to leakage to improve comfort with daily routine. PN: changes underwear with stress UI  4. Pt will participate in 30 minute HIIT class without leakage, indicating improved pelvic floor endurance for improved comfort with activity.                 PN: had started doing core classes      PLAN  []  Upgrade activities as tolerated     [x]  Continue plan of care  []  Update interventions per flow sheet       []  Discharge due to:_   []  Other:_      Shona Boucher, PT 1/10/2023  9:58 AM    Future Appointments   Date Time Provider Stefan Rubalcava   1/10/2023 10:00 AM Liya Resides, PT [facility]   1/20/2023  2:30 PM Liya Resides, PT [facility]   1/24/2023 10:00 AM Liya Resides, PT [facility]   1/31/2023 10:00 AM Liya Resides, PT [facility]

## 2023-01-20 ENCOUNTER — HOSPITAL ENCOUNTER (OUTPATIENT)
Dept: PHYSICAL THERAPY | Age: 38
Discharge: HOME | End: 2023-01-20
Payer: OTHER GOVERNMENT

## 2023-01-20 PROCEDURE — 97112 NEUROMUSCULAR REEDUCATION: CPT

## 2023-01-20 PROCEDURE — 97110 THERAPEUTIC EXERCISES: CPT

## 2023-01-20 PROCEDURE — 97530 THERAPEUTIC ACTIVITIES: CPT

## 2023-01-20 NOTE — PROGRESS NOTES
[facility]  [place], [address]  [phone] fax    Pelvic Floor Progress Note  Patient name: Sandra Yeboah Start of Care: 11/10/2022   Referral source: LINDA Joe : 1985   Medical/Treatment Diagnosis: Pelvic floor dysfunction [M62.89]  Payor: OPTIMA / Plan: Odin Ferraro / Product Type: MERRY /  Onset Date:2 years     Prior Hospitalization: see medical history Provider#: [id number]   Medications: Verified on Patient Summary List    Comorbidities: allergies, arthritis, BMI > 30, GI dysfunction   Prior Level of Function: had mild stress UI following birth of twins in , no UI prior to this                      Visits from Start of Care: 6    Missed Visits: 0    Established Goals:           Excellent Good         Limited           None  [] Increase Pelvic MM strength []  [x]  []  []  [] Decrease Incontinence Episodes []  [x]  []  []   [] Improve Voiding Habits  []  [x]  []  []    Key Functional Changes: 60% improvement in stress UI  Updated Goals: to be achieved in 6 weeks:   Patient will report independence with HEP to maximize therapeutic outcomes. PN: performing kegels semi-regularly and HEP less regularly. 2. Patient will report 75% improvement in urinary incontinence to reduce discomfort with ADLs. PN: now 1-2x/week versus daily, 60% better; leakage with exercise   3. Pt will report not needing to change her underwear due to leakage to improve comfort with daily routine. PN: improving, only 1-2 instances per week of changing underwear   4. Pt will participate in 30 minute HIIT class without leakage, indicating improved pelvic floor endurance for improved comfort with activity.                 PN: remains, tried doing two incline pushups and had leakage with up   ASSESSMENT/RECOMMENDATIONS:  Since [facility] ([facility] [address]), she has progressed from near-daily leaks to stress UI only 1-2 times a week. These leakage episodes now only occur with exercise attempts when not consciously kegeling in advance. Her primary goal is to return to fitness classes and regular exercise without needing to change her underwear due to leakage. Pt maintains some weakness of her PF and TrA, and there is a direct relationship between good anterior chain stability and stress continence. She has been working on improving her breath management to reduce intra-abdominal pressure with conscious exercise. PT recommends continuation of pelvic PT to achieve remaining therapy goals and reduce stress UI.   [x]Continue therapy per initial plan/protocol at a frequency of  1 x per week for 6 weeks  []Continue therapy with the following recommended changes:_____________________      _____________________________________________________________________  []Discontinue therapy progressing towards or have reached established goals  []Discontinue therapy due to lack of appreciable progress towards goals  []Discontinue therapy due to lack of attendance or compliance  []Await Physician's recommendations/decisions regarding therapy  []Other:________________________________________________________________    Thank you for this referral.   Jcarlos Morel, PT 1/20/2023 3:51 PM    NOTE TO PHYSICIAN:  PLEASE COMPLETE THE ORDERS BELOW AND  FAX TO [facility]: [phone]  If you are unable to process this request in 24 hours please contact our office: [phone]    I have read the above report and request that my patient continue as recommended. I have read the above report and request that my patient continue therapy with the following changes/special instructions:___________________________________________________________  I have read the above report and request that my patient be discharged from therapy.     Physician's Signature:____________Date:_________TIME:________     LINDA Noonan  ** Signature, Date and Time must be completed for valid certification **

## 2023-01-20 NOTE — PROGRESS NOTES
PF DAILY TREATMENT NOTE [facility] 3-16    Patient Name: Kelli Taveras  Date:2023  : 1985  [x]  Patient  Verified  Payor: Doyle Ortiz / Plan: Bettie Hassan / Product Type: MERRY /    In time:233pm  Out time:330pm  Total Treatment Time (min): 62    Visit #: 1 of 6    Treatment Area: [x] Pelvic Floor     [] Other:    SUBJECTIVE  Pain Level (0-10 scale): 0  Any medication changes, allergies to medications, adverse drug reactions, diagnosis change, or new procedure performed?: [x] No    [] Yes (see summary sheet for update)  Subjective functional status/changes:   [] No changes reported  Reports not has consistent this week with exercises. OBJECTIVE    24 min Therapeutic Exercise:  [] See flow sheet :dead bugs, bird dogs, stretching, S/L hip abd and circles, QP leg extensions   []  Pelvic floor strengthening                 []  Pelvic floor downtraining  []  Quality pelvic floor contractions       []  Relaxation techniques  []  Urge suppression exercises  []  Other:  Rationale: increase ROM and increase strength  to improve the patients ability to perform ADLs with improved ease and reduced leakage. 8 min Therapeutic Activity:  []  See flow sheet :working on intraabdominal pressure with exhale in combination with stabilization transfers such as STS, squat as well as with hip ABD activities. Suitcase carries 120ft B    []  Increase Tissue extensibility        []  Assess fiber intake    []  Assess voiding habits  []  Assess bowel habits  []  Other:   Rationale: increase strength and improve coordination  to improve the patients ability to toilet without leakage upon standing.         25 min Neuromuscular Re-education:  [x]  See flow sheet :reformer pilates based core strengthening, Nancy core strengthening, dead lifts   [x]  Pelvic floor strengthening                 []  Pelvic floor downtraining  []  Quality pelvic floor contractions       []  Relaxation techniques  []  Urge suppression exercises  [x] Other:TrA strengthening, mtn climbers  Rationale: increase strength and improve coordination  to improve the patients ability to participate in exercise classes with improved abdominal and pelvic stabilization. With   [] TE   [] TA   [] neuro  [] manual   [] other: Patient Education: [x] Review HEP    [] Progressed/Changed HEP based on:   [] positioning   [] body mechanics   [] transfers   [] heat/ice application    [] other:      Other Objective/Functional Measures:   See PN    Pain Level (0-10 scale) post treatment: 0    ASSESSMENT/Changes in Function: Since [facility] ([facility] [address] [facility]), she has progressed from near-daily leaks to stress UI only 1-2 times a week. These leakage episodes now only occur with exercise attempts when not consciously kegeling in advance. Her primary goal is to return to fitness classes and regular exercise without needing to change her underwear due to leakage. Pt maintains some weakness of her PF and TrA, and there is a direct relationship between good anterior chain stability and stress continence. She has been working on improving her breath management to reduce intra-abdominal pressure with conscious exercise.  PT recommends continuation of pelvic PT to achieve remaining therapy goals and reduce stress UI.     []  Decrease # of leaks   [] No change []  Improving [] Resolved     []  Decrease hypertonus [] No change []  Improving [] Resolved     []  Increase void interval [] No change []  Improving [] Resolved     []  Increase PF strength [] No change []  Improving [] Resolved     []  Increase PF endurance [] No change []  Improving [] Resolved     []  Increase endurance [] No change []  Improving [] Resolved     []  Decrease # of pads [] No change []  Improving [] Resolved     []  Decrease pain [] No change []  Improving [] Resolved     []  Increased coordination [] No change []  Improving [] Resolved     []  Increased Bowel Frequency [] No change []  Improving [] Resolved       Patient will continue to benefit from skilled PT services to modify and progress therapeutic interventions, address functional mobility deficits, address ROM deficits, address strength deficits, analyze and address soft tissue restrictions, analyze and cue movement patterns, analyze and modify body mechanics/ergonomics, assess and modify postural abnormalities, address imbalance/dizziness, and instruct in home and community integration to attain remaining goals. []  See Plan of Care  []  See progress note/recertification  []  See Discharge Summary         Progress towards goals / Updated goals:   Patient will report independence with HEP to maximize therapeutic outcomes. PN: reports improving compliance    Current: performing kegels semi-regularly and HEP less regularly. (1/10/2023)   2. Patient will report 50% improvement in urinary incontinence to reduce discomfort with ADLs. PN: cough had leakage, and one instance of leakage 1 minute after using the bathroom. Current: now 1-2x/week versus daily, 60% better; leakage with exercise (1/20/2023)  3. Pt will report not needing to change her underwear due to leakage to improve comfort with daily routine. PN: changes underwear with stress UI   Current: improving, only 1-2 instances per week of changing underwear (1/20/2023)  4. Pt will participate in 30 minute HIIT class without leakage, indicating improved pelvic floor endurance for improved comfort with activity.                 PN: had started doing core classes   Current: remains, tried doing two incline pushups and had leakage with up (1/20/2023)    PLAN  []  Upgrade activities as tolerated     [x]  Continue plan of care  []  Update interventions per flow sheet       []  Discharge due to:_  []  Other:_      June Porter PT 1/20/2023  2:37 PM    Future Appointments   Date Time Provider Stefan Rubalcava   1/24/2023 10:00 AM Karissa Almanzar, PT [facility]   1/31/2023 10:00 AM [facility]

## 2023-01-24 ENCOUNTER — HOSPITAL ENCOUNTER (OUTPATIENT)
Dept: PHYSICAL THERAPY | Age: 38
Discharge: HOME | End: 2023-01-24
Payer: OTHER GOVERNMENT

## 2023-01-24 PROCEDURE — 97110 THERAPEUTIC EXERCISES: CPT

## 2023-01-24 PROCEDURE — 97530 THERAPEUTIC ACTIVITIES: CPT

## 2023-01-24 PROCEDURE — 97112 NEUROMUSCULAR REEDUCATION: CPT

## 2023-01-24 NOTE — PROGRESS NOTES
PF DAILY TREATMENT NOTE [facility]    Patient Name: Segun Fenton  Date:2023  : 1985  [x]  Patient  Verified  Payor: Jose Elias Human / Plan: [employer] / Product Type: MERRY /    In time:10AM  Out time:11am  Total Treatment Time (min): 60  Visit #: 2 of 6    Treatment Area: [x] Pelvic Floor     [] Other:    SUBJECTIVE  Pain Level (0-10 scale): 0   Any medication changes, allergies to medications, adverse drug reactions, diagnosis change, or new procedure performed?: [x] No    [] Yes (see summary sheet for update)  Subjective functional status/changes:   [] No changes reported  No sx since last Friday. Exercises have been \"non-existent\" secondary to [employer] busy. \" Dad was in ER for pulled muscle. OBJECTIVE    25 min Therapeutic Exercise:  [] See flow sheet :dead bugs, bird dogs, stretching, S/L hip abd and circles, QP leg extensions   []  Pelvic floor strengthening                 []  Pelvic floor downtraining  []  Quality pelvic floor contractions       []  Relaxation techniques  []  Urge suppression exercises  []  Other:  Rationale: increase ROM and increase strength  to improve the patients ability to perform ADLs with improved ease and reduced leakage. 25 min Therapeutic Activity:  []  See flow sheet :working on intraabdominal pressure with exhale in combination with stabilization transfers such as STS, squat as well as with hip ABD activities. Suitcase carries 120ft B. Discussed trying to use Revive Bladder Support for days when she wants to run or do an intense workout with reduced leakage, but not using this as a substitute for PF strengthening and intra-abdominal mechanics. Discussed kegel weights briefly, with benefits and drawbacks and that they are not necessary to be successful but can be helpful proprioceptive feedback as she already has some.  Sled press pushes and dead lifts to reduce leakage with functional activities    []  Increase Tissue extensibility        []  Assess fiber intake []  Assess voiding habits  []  Assess bowel habits  []  Other:   Rationale: increase strength and improve coordination  to improve the patients ability to toilet without leakage upon standing. 10 min Neuromuscular Re-education:  []  See flow sheet :reformer pilates based core strengthening   []  Pelvic floor strengthening                 []  Pelvic floor downtraining  []  Quality pelvic floor contractions       []  Relaxation techniques  []  Urge suppression exercises  []  Other:  Rationale: increase strength and improve coordination  to improve the patients ability to participate in exercise classes with improved abdominal and pelvic stabilization. With   [] TE   [] TA   [] neuro  [] manual   [] other: Patient Education: [x] Review HEP    [] Progressed/Changed HEP based on:   [] positioning   [] body mechanics   [] transfers   [] heat/ice application    [] other:      Other Objective/Functional Measures: Added sled press 120 ft x 55# and 120 ft x 70#    Pain Level (0-10 scale) post treatment: 0    ASSESSMENT/Changes in Function: Pt completes all exercises without leakage. Discussed the Revive pelvic insert device as a way to complete runs or high intensity exercise classes with reduced leakage as she continues to work on her PF strengthening. Advised pt to wear light pantyliner protection for next visit for a trial of plyometric based exercises. May start with shuttle press jumps.      []  Decrease # of leaks   [] No change []  Improving [] Resolved     []  Decrease hypertonus [] No change []  Improving [] Resolved     []  Increase void interval [] No change []  Improving [] Resolved     []  Increase PF strength [] No change []  Improving [] Resolved     []  Increase PF endurance [] No change []  Improving [] Resolved     []  Increase endurance [] No change []  Improving [] Resolved     []  Decrease # of pads [] No change []  Improving [] Resolved     []  Decrease pain [] No change [] Improving [] Resolved     []  Increased coordination [] No change []  Improving [] Resolved     []  Increased Bowel Frequency [] No change []  Improving [] Resolved       Patient will continue to benefit from skilled PT services to modify and progress therapeutic interventions, address functional mobility deficits, address ROM deficits, address strength deficits, analyze and address soft tissue restrictions, analyze and cue movement patterns, analyze and modify body mechanics/ergonomics, assess and modify postural abnormalities, address imbalance/dizziness, and instruct in home and community integration to attain remaining goals. []  See Plan of Care  []  See progress note/recertification  []  See Discharge Summary         Progress towards goals / Updated goals:   Patient will report independence with HEP to maximize therapeutic outcomes. PN: performing kegels semi-regularly and HEP less regularly. Current: less consistent performance (1/24/2023)  2. Patient will report 75% improvement in urinary incontinence to reduce discomfort with ADLs. PN: now 1-2x/week versus daily, 60% better; leakage with exercise   3. Pt will report not needing to change her underwear due to leakage to improve comfort with daily routine. PN: improving, only 1-2 instances per week of changing underwear   4. Pt will participate in 30 minute HIIT class without leakage, indicating improved pelvic floor endurance for improved comfort with activity.                PN: remains, tried doing two incline pushups and had leakage with up     PLAN  []  Upgrade activ ities as tolerated     [x]  Continue plan of care  []  Update interventions per flow sheet       []  Discharge due to:_  []  Other:_      Martha Montano, PT 1/24/2023  9:31 AM    Future Appointments   Date Time Provider Stefan Rubalcava   1/24/2023 10:00 AM Dave Pruett, PT [facility]   1/31/2023 10:00 AM Dave Pruett, PT [facility]   2/9/2023 11:00 AM [facility]   2/16/2023  1:00 PM [facility]IA Taver[facility]   2/20/2023 11:00 AM GRACIA Taveras [facility]

## 2023-01-31 ENCOUNTER — HOSPITAL ENCOUNTER (OUTPATIENT)
Dept: PHYSICAL THERAPY | Age: 38
Discharge: HOME | End: 2023-01-31
Payer: OTHER GOVERNMENT

## 2023-01-31 PROCEDURE — 97112 NEUROMUSCULAR REEDUCATION: CPT

## 2023-01-31 PROCEDURE — 97110 THERAPEUTIC EXERCISES: CPT

## 2023-01-31 PROCEDURE — 97530 THERAPEUTIC ACTIVITIES: CPT

## 2023-01-31 NOTE — PROGRESS NOTES
PF DAILY TREATMENT NOTE [facility] [address]    Patient Name: Anthony Contreras  Date:2023  : 1985  [x]  Patient  Verified  Payor: KAVON / Plan: Pedro Harrington 74 / Product Type:  /    In time:10am  Out time:1057am  Total Treatment Time (min): 57  Total Timed Codes (min): 62    Visit #: 3 of 6    Treatment Area: [x] Pelvic Floor     [] Other:    SUBJECTIVE  Pain Level (0-10 scale): 0  Any medication changes, allergies to medications, adverse drug reactions, diagnosis change, or new procedure performed?: [x] No    [] Yes (see summary sheet for update)  Subjective functional status/changes:   [] No changes reported  Reports some increased consistency with kegels most days    OBJECTIVE      24 min Therapeutic Exercise:  [] See flow sheet : 1/2 plank supported on plinth position with modified jumping jacks with kegel 15 x 2 sets and jumps 15 x 2 sets. Mtn climbers supported on lowered plinth 15 x 2 sets. Box jumps onto 12 inch box x 10 with kegl, vertical hops x 30 seconds with 2 sets; sidelying hip abd and circles, quadruped therEx. [x]  Pelvic floor strengthening                 []  Pelvic floor downtraining  [x]  Quality pelvic floor contractions       []  Relaxation techniques  []  Urge suppression exercises  []  Other:  Rationale: increase ROM and increase strength  to improve the patients ability to perform ADLs with improved ease and reduced leakage. 23 min Therapeutic Activity:  []  See flow sheet : sled pull forward and back, STS with effort from varied surface heights and on dora disc, stretching, dead lifts.  Discussion of pelvic wand for right sided pelvic floor pain with OB/GYN exam. Discussion of transition to workouts with exhale with effort    []  Increase Tissue extensibility        []  Assess fiber intake    []  Assess voiding habits  []  Assess bowel habits  []  Other:   Rationale: increase ROM and improve coordination  to improve the patients ability to manage ADLs with reduced leakage. 10 min Neuromuscular Re-education:  []  See flow sheet : reformer pilates based core strengthening   [x]  Pelvic floor strengthening                 []  Pelvic floor downtraining  [x]  Quality pelvic floor contractions       []  Relaxation techniques  []  Urge suppression exercises  []  Other:  Rationale: increase strength and improve coordination  to improve the patients ability to manage ADLs with improved leakage. With   [] TE   [] TA   [] neuro  [] manual   [] other: Patient Education: [x] Review HEP    [] Progressed/Changed HEP based on:   [] positioning   [] body mechanics   [] transfers   [] heat/ice application    [] other:      Other Objective/Functional Measures:   Exercise with leakage. Pain Level (0-10 scale) post treatment: 0    ASSESSMENT/Changes in Function: Pt performed exercises as directed with good exhale with effort. Advised her to use a sex toy to trial TrPR/STM of the right side of the posterior pelvic floor and if this does not work to get a pelvic wand, which is specifically shaped for this task. Pt does well with intro to plyometrics, not experiencing any leakage.      []  Decrease # of leaks   [] No change []  Improving [] Resolved     []  Decrease hypertonus [] No change []  Improving [] Resolved     []  Increase void interval [] No change []  Improving [] Resolved     []  Increase PF strength [] No change []  Improving [] Resolved     []  Increase PF endurance [] No change []  Improving [] Resolved     []  Increase endurance [] No change []  Improving [] Resolved     []  Decrease # of pads [] No change []  Improving [] Resolved     []  Decrease pain [] No change []  Improving [] Resolved     []  Increased coordination [] No change []  Improving [] Resolved     []  Increased Bowel Frequency [] No change []  Improving [] Resolved       Patient will continue to benefit from skilled PT services to modify and progress therapeutic interventions, address functional mobility deficits, address ROM deficits, address strength deficits, analyze and address soft tissue restrictions, analyze and cue movement patterns, analyze and modify body mechanics/ergonomics, assess and modify postural abnormalities, address imbalance/dizziness, and instruct in home and community integration to attain remaining goals. []  See Plan of Care  []  See progress note/recertification  []  See Discharge Summary         Progress towards goals / Updated goals:   Patient will report independence with HEP to maximize therapeutic outcomes. PN: performing kegels semi-regularly and HEP less regularly. Current:  the last three days, more consistent 3x/day performance (1/31/2023)  2. Patient will report 75% improvement in urinary incontinence to reduce discomfort with ADLs. PN: now 1-2x/week versus daily, 60% better; leakage with exercise   3. Pt will report not needing to change her underwear due to leakage to improve comfort with daily routine. PN: improving, only 1-2 instances per week of changing underwear   4. Pt will participate in 30 minute HIIT class without leakage, indicating improved pelvic floor endurance for improved comfort with activity.                PN: remains, tried doing two incline pushups and had leakage with up   Current: has been doing HEP more consistently, plans to try HIIT class with Revive stress UI device (1/31/2023)       PLAN  []  Upgrade activities as tolerated     [x]  Continue plan of care  []  Update interventions per flow sheet       []  Discharge due to:_  []  Other:_      Ekaterina Womack PT 1/31/2023  10:03 AM    Future Appointments   Date Time Provider Stefan Rubalcava   2[facility]2023 11:00 [facility]   2/16/2023  1:00 PM Felicia Evans PT [facility]   2/20/2023 11:00 AM Felicia Evans, PT [facility]

## 2023-02-09 ENCOUNTER — HOSPITAL ENCOUNTER (OUTPATIENT)
Dept: PHYSICAL THERAPY | Age: 38
Discharge: HOME | End: 2023-02-09
Payer: COMMERCIAL

## 2023-02-09 PROCEDURE — 97112 NEUROMUSCULAR REEDUCATION: CPT

## 2023-02-09 PROCEDURE — 97110 THERAPEUTIC EXERCISES: CPT

## 2023-02-09 NOTE — PROGRESS NOTES
PT DAILY TREATMENT NOTE     Patient Name: Saad Quarles  Date:2023  : 1985  [x]  Patient  Verified  Payor: Tristan Webb / Plan: Bro Alejo / Product Type: MERRY /    In time:11  Out time:1156am  Total Treatment Time (min): 56  Visit #: 4 of 6    Treatment Area: Pelvic floor dysfunction [M62.89]    SUBJECTIVE  Pain Level (0-10 scale): 0  Any medication changes, allergies to medications, adverse drug reactions, diagnosis change, or new procedure performed?: [x] No    [] Yes (see summary sheet for update)  Subjective functional status/changes:   [] No changes reported  A few instances of urinary leakage within a few minutes of toileting, some of them feeling a \"bubble\" after, others not noticing. Almost every day. Few drops each time. Larger leakage after lifting dog crate despite kegel-ing and trying to \"breathe normally. \" Did an hour long step class without any leakage. Isolated jumping up and down with leakage prior to last pregnancy (better now). Toilets every 2-4 hours. Will make sure she goes to the bathroom before pumping because it's a 30 minute session and before 60 minute drives. OBJECTIVE     30 min Therapeutic Exercise:  [] See flow sheet : 1/2 plank supported on plinth position with modified jumping jacks with kegel 15 x 2 sets and jumps 15 x 2 sets. Mtn climbers supported on lowered plinth 15 x 2 sets. Box jumps onto 12 inch box x 10 with kegl, vertical hops x 30 seconds with 2 sets; sidelying hip abd and circles, quadruped therEx. Suitcase carries and front carries per flowsheet, dead lifts   [x]  Pelvic floor strengthening                 []  Pelvic floor downtraining  [x]  Quality pelvic floor contractions       []  Relaxation techniques  []  Urge suppression exercises  []  Other:   Rationale: increase ROM and increase strength  to improve the patients ability to perform ADLs with improved ease and reduced leakage.       26 min Neuromuscular Re-education:  []  See flow sheet : reformer pilates based core strengthening   [x]  Pelvic floor strengthening                 []  Pelvic floor downtraining  [x]  Quality pelvic floor contractions       []  Relaxation techniques  []  Urge suppression exercises  []  Other:  Rationale: increase strength and improve coordination  to improve the patients ability to manage ADLs with improved leakage. With   [] TE   [] TA   [] neuro   [] other: Patient Education: [x] Review HEP    [] Progressed/Changed HEP based on:   [] positioning   [] body mechanics   [] transfers   [] heat/ice application    [] other:      Other Objective/Functional Measures: exercises progressed per flowsheet     Pain Level (0-10 scale) post treatment: 0    ASSESSMENT/Changes in Function: Discussed working on toileting mechanics including pelvic rocking, deep breathing techniques, leaning forward with hand pressure on lower abdomen, and standing up followed by sitting back down with deep breathing and forward leans. At the sink, advised a set of quick kegels, and another set of quick kegels when walking with the pump to the couch in an effort to see if leakage improves with good techniques. Worked on lift and carries with good exhale mechanics to reduce opportunity for leakage with activity at home. Patient will continue to benefit from skilled PT services to modify and progress therapeutic interventions, address functional mobility deficits, address ROM deficits, address strength deficits, analyze and address soft tissue restrictions, analyze and cue movement patterns, analyze and modify body mechanics/ergonomics, assess and modify postural abnormalities, address imbalance/dizziness, and instruct in home and community integration to attain remaining goals.      []  See Plan of Care  []  See progress note/recertification  []  See Discharge Summary         Progress towards goals / Updated goals:   Patient will report independence with HEP to maximize therapeutic outcomes. PN: performing kegels semi-regularly and HEP less regularly. Current:  the last three days, more consistent 3x/day performance (1/31/2023)   2. Patient will report 75% improvement in urinary incontinence to reduce discomfort with ADLs. PN: now 1-2x/week versus daily, 60% better; leakage with exercise    Current: leakage daily over the last week, frequently within a few minutes after toileting. (2/9/2023)  3. Pt will report not needing to change her underwear due to leakage to improve comfort with daily routine. PN: improving, only 1-2 instances per week of changing underwear   4. Pt will participate in 30 minute HIIT class without leakage, indicating improved pelvic floor endurance for improved comfort with activity.                PN: remains, tried doing two incline pushups and had leakage with up   Current: has been doing HEP more consistently, plans to try HIIT class with Revive stress UI device (1/31/2023)       PLAN  []  Upgrade activities as tolerated     [x]  Continue plan of care  []  Update interventions per flow sheet       []  Discharge due to:_  []  Other:_      Radha Bonner, PT 2/9/2023  11:02 AM    Future Appointments   Date Time Provider Stefan Rubalcava   2/16/2023  1:00 [facility]   2/20/2023 11:00 AM [facility]

## 2023-02-16 ENCOUNTER — HOSPITAL ENCOUNTER (OUTPATIENT)
Facility: HOSPITAL | Age: 38
Setting detail: RECURRING SERIES
Discharge: HOME | End: 2023-02-19
Payer: OTHER GOVERNMENT

## 2023-02-16 ENCOUNTER — APPOINTMENT (OUTPATIENT)
Dept: PHYSICAL THERAPY | Age: 38
End: 2023-02-16
Payer: COMMERCIAL

## 2023-02-16 PROCEDURE — 97110 THERAPEUTIC EXERCISES: CPT

## 2023-02-16 PROCEDURE — 97112 NEUROMUSCULAR REEDUCATION: CPT

## 2023-02-16 NOTE — PROGRESS NOTES
[address] [place], [address] - Ph: [phone]   Fx: [phone]    PHYSICAL THERAPY PROGRESS NOTE      Patient name: Roopa Lewis Start of Care: 2023   Referral source: No ref. provider found : 1985    Medical Diagnosis: No admission diagnoses are documented for this encounter. Payor: / No coverage found. Onset Date:2 years    Treatment Diagnosis: stress UI   Prior Hospitalization: see medical history Provider#: [id number]   Medications: Verified on Patient summary List   Comorbidities: allergies, arthritis, BMI > 30, GI dysfunction   Prior Level of Function: had mild stress UI following birth of twins in , no UI prior to this             Visits from Start of Care: 10    Missed Visits: 0    Updated Goals/Measure of Progress: To be achieved in 4-6 weeks:     Patient will report independence with HEP to maximize therapeutic outcomes. PN: inconsistent with busy schedule. Trying fitness classes when able. 3-5 kegels 'here and there' throughout the day. 2. Pt will report not needing to change her underwear due to leakage to improve comfort with daily routine. PN: progressing, no underwear changing this week, 1 time last week. Summary of Care/ Key Functional Changes: Pt has completed 3 HIIT classes without leakage. However, with lifting of her dog crate with her , she experienced leakage. She has experienced on and off leakage following toileting, but this week has been better with \"toilet yoga\" pelvic rotations, pelvic rocking, deep breaths, and STS followed by seated toilet again. Emphasized not pushing while urinating and on relaxed voiding techniques. She feels 70-80% better with stress UI sx.  Pt will continue to benefit from pelvic PT to reduce urinary stress incontinence and improve intra-abdominal pressure management with ADLs and fitness classes and to maintain self care strategies.     ASSESSMENT/RECOMMENDATIONS:  [x]Continue therapy per initial plan/protocol at a frequency of  1 x per week for 6 weeks  []Continue therapy with the following recommended changes:_____________________      _____________________________________________________________________  []Discontinue therapy progressing towards or have reached established goals  []Discontinue therapy due to lack of appreciable progress towards goals  []Discontinue therapy due to lack of attendance or compliance  []Await Physician's recommendations/decisions regarding therapy  []Other:________________________________________________________________    Thank you for this referral.   Garry Orlando, PT 2/16/2023 1:58 PM

## 2023-02-16 NOTE — PROGRESS NOTES
PHYSICAL / OCCUPATIONAL THERAPY - DAILY TREATMENT NOTE (updated )    Patient Name: Brando Killian    Date: 2023    : 1985  Insurance: Payor: / No coverage found. Patient  verified Yes     Visit #   Current / Total 5 6   Time   In / Out 103pm 155pm   Pain   In / Out 0 0   Subjective Functional Status/Changes: Sitting and focusing on relaxing on the pelvic floor on the toilet has helped with getting a more complete bladder empty. There has been less leakage this week. Did a cardio kickboxing class without issue. Used the revive when moving the dog crate and did not feel it. Changes to:  Meds, Allergies, Med Hx, Sx Hx? If yes, update Summary List no         TREATMENT AREA =  No admission diagnoses are documented for this encounter. OBJECTIVE    Therapeutic Procedures: Tx Min Billable or 1:1 Min (if diff from Tx Min) Procedure, Rationale, Specifics   25  [id number] Therapeutic Exercise (timed):  increase ROM, strength, coordination, balance, and proprioception to improve patient's ability to progress to PLOF and address remaining functional goals. (see flow sheet as applicable)     Details if applicable:  Rationale: increase ROM and increase strength  to improve the patients ability to perform ADLs with improved ease and reduced leakage. 27  [id number] Neuromuscular Re-Education (timed):  improve balance, coordination, kinesthetic sense, posture, core stability and proprioception to improve patient's ability to develop conscious control of individual muscles and awareness of position of extremities in order to progress to PLOF and address remaining functional goals. (see flow sheet as applicable)     Details if applicable:  Rationale: increase strength and improve coordination  to improve the patients ability to manage ADLs with improved leakage.            Details if applicable:            Details if applicable:            Details if applicable:       [facility] BC Totals Reminder: bill using total billable min of TIMED therapeutic procedures (example: do not include dry needle or estim unattended, both untimed codes, in totals to left)  8-22 min = 1 unit; 23-37 min = 2 units; 38-52 min = 3 units; 53-67 min = 4 units; 68-82 min = 5 units   Total Total     [x]  Patient Education billed concurrently with other procedures   [x] Review HEP    [] Progressed/Changed HEP, detail:    [] Other detail:       Objective Information/Functional Measures/Assessment    Pt does well today with pressure management, experiencing no leakage. Discussed continuing therapy for 4 more weeks, working on stress UI management with lifting and relaxed toileting to reduce leakage with pumping. Patient will continue to benefit from skilled PT / OT services to modify and progress therapeutic interventions, analyze and address functional mobility deficits, analyze and address ROM deficits, analyze and address strength deficits, analyze and address soft tissue restrictions, analyze and cue for proper movement patterns, analyze and modify for postural abnormalities, analyze and address imbalance/dizziness, and instruct in home and community integration to address functional deficits and attain remaining goals. Progress toward goals / Updated goals:  []  See Progress Note/Recertification     Patient will report independence with HEP to maximize therapeutic outcomes. PN: performing kegels semi-regularly and HEP less regularly. Current:  inconsistent with busy schedule. Trying fitness classes when able. 3-5 kegels 'here and there' throughout the day. (2/16/2023)  2. Patient will report 75% improvement in urinary incontinence to reduce discomfort with ADLs. PN: now 1-2x/week versus daily, 60% better; leakage with exercise               Current: leakage daily over the last week, frequently within a few minutes after toileting. (2/9/2023)  3.   Pt will report not needing to change her underwear due to leakage to improve comfort with daily routine. PN: improving, only 1-2 instances per week of changing underwear    Current: progressing, no underwear changing this week, 1 time last week. (2/16/2023)    4. Pt will participate in 30 minute HIIT class without leakage, indicating improved pelvic floor endurance for improved comfort with activity. PN: remains, tried doing two incline pushups and had leakage with up   Current: met, tried three HIIT classes (step, body pump, and kickboxing) without leakage.  (2/16/2023)    PLAN  Yes  Continue plan of care  []  Upgrade activities as tolerated  []  Discharge due to :   []  Other:    Chaim Massey PT    2/16/2023    11:40 AM    Future Appointments   Date Time Provider Luis Ly   2/16/2023  1:00 PM Chaim Massey PT [facility]   2/20/2023 11:00 AM MADIE Sdidiqi

## 2023-02-20 ENCOUNTER — APPOINTMENT (OUTPATIENT)
Dept: PHYSICAL THERAPY | Age: 38
End: 2023-02-20
Payer: COMMERCIAL

## 2023-02-20 ENCOUNTER — HOSPITAL ENCOUNTER (OUTPATIENT)
Facility: HOSPITAL | Age: 38
Setting detail: RECURRING SERIES
Discharge: HOME | End: 2023-02-23
Payer: OTHER GOVERNMENT

## 2023-02-20 PROCEDURE — 97110 THERAPEUTIC EXERCISES: CPT

## 2023-02-20 PROCEDURE — 97112 NEUROMUSCULAR REEDUCATION: CPT

## 2023-02-20 NOTE — PROGRESS NOTES
PHYSICAL / OCCUPATIONAL THERAPY - DAILY TREATMENT NOTE (updated )    Patient Name: Viral     Date: 2023    : 1985  Insurance: Payor:  EAST / Plan:  EAST  / Product Type: *No Product type* /      Patient  verified Yes     Visit #   Current / Total 1 6   Time   In / Out 11am 1158am   Pain   In / Out 0 0   Subjective Functional Status/Changes: Pt participated in a Body Pump high intensity class without leakage. She had one instance of leakage since her last visit not associated with post-toileting or lifting but she can't remember what she was doing when it happened. Changes to:  Meds, Allergies, Med Hx, Sx Hx? If yes, update Summary List no         TREATMENT AREA =  No admission diagnoses are documented for this encounter. OBJECTIVE    Therapeutic Procedures: Tx Min Billable or 1:1 Min (if diff from Tx Min) Procedure, Rationale, Specifics   25  [id number] Therapeutic Exercise (timed):  increase ROM, strength, coordination, balance, and proprioception to improve patient's ability to progress to PLOF and address remaining functional goals. (see flow sheet as applicable)     Details if applicable:  Rationale: increase ROM and increase strength  to improve the patients ability to perform ADLs with improved ease and reduced leakage. 23  [id number] Neuromuscular Re-Education (timed):  improve balance, coordination, kinesthetic sense, posture, core stability and proprioception to improve patient's ability to develop conscious control of individual muscles and awareness of position of extremities in order to progress to PLOF and address remaining functional goals. (see flow sheet as applicable)     Details if applicable:  Rationale: increase strength and improve coordination  to improve the patients ability to manage ADLs with improved leakage.            Details if applicable:            Details if applicable:            Details if applicable:     62  [address] Totals Reminder: bill using total billable min of TIMED therapeutic procedures (example: do not include dry needle or estim unattended, both untimed codes, in totals to left)  8-22 min = 1 unit; 23-37 min = 2 units; 38-52 min = 3 units; 53-67 min = 4 units; 68-82 min = 5 units   Total Total     [x]  Patient Education billed concurrently with other procedures   [x] Review HEP    [] Progressed/Changed HEP, detail:    [] Other detail:       Objective Information/Functional Measures/Assessment    Pt performs all exercises as directed, needing occasional cues for breath control. No leakage experienced during session with plyometrics, carries, throw downs, walks, or squats. Due to continued spontaneous leakage, recommend continued pelvic floor PT at a frequency of 1x/week for the next 4-5 weeks. Patient will continue to benefit from skilled PT / OT services to modify and progress therapeutic interventions, analyze and address functional mobility deficits, analyze and address ROM deficits, analyze and address strength deficits, analyze and address soft tissue restrictions, analyze and cue for proper movement patterns, analyze and modify for postural abnormalities, analyze and address imbalance/dizziness, and instruct in home and community integration to address functional deficits and attain remaining goals. Progress toward goals / Updated goals:  []  See Progress Note/Recertification     Patient will report independence with HEP to maximize therapeutic outcomes. PN: inconsistent with busy schedule. Trying fitness classes when able. 3-5 kegels 'here and there' throughout the day. 2. Pt will report not needing to change her underwear due to leakage to improve comfort with daily routine. PN: progressing, no underwear changing this week, 1 time last week.    Current: 1 episode of leakage this week (2/20/2023)    PLAN  Yes  Continue plan of care  []  Upgrade activities as tolerated  []  Discharge due to :   [] Other:    Kevyn Costa, MADIE    2/20/2023    8:31 AM    Future Appointments   Date Time Provider Luis Ly   2/20/2023 11:00 AM Kevyn Costa, MADIE Alejo

## 2023-03-14 ENCOUNTER — HOSPITAL ENCOUNTER (OUTPATIENT)
Facility: HOSPITAL | Age: 38
Setting detail: RECURRING SERIES
Discharge: HOME | End: 2023-03-17
Payer: OTHER GOVERNMENT

## 2023-03-14 PROCEDURE — 97530 THERAPEUTIC ACTIVITIES: CPT

## 2023-03-14 PROCEDURE — 97112 NEUROMUSCULAR REEDUCATION: CPT

## 2023-03-14 NOTE — PROGRESS NOTES
[facility] at [address] [place] [address]  Ph [phone]  Fx [phone]    Pelvic Floor Progress Note     Patient name: Lin Zamudio Start of Care: 2023   Referral source: Sania Scott : 1985               Medical Diagnosis: Pelvic floor dysfunction [M62.89]  Payor: [employer] / Plan: Roseanne Mejias / Product Type: RODR[employer]GUEZ /  Onset Date:2 years               Treatment Diagnosis: stress UI   Prior Hospitalization: see medical history Provider#: [id number]   Medications: Verified on Patient summary List   Comorbidities: allergies, arthritis, BMI > 30, GI dysfunction   Prior Level of Function: had mild stress UI following birth of twins in , no UI prior to this              Visits from Start of Care: 12    Missed Visits: 0    Established Goals:             Excellent   Good           Limited             None  [] Increase Pelvic MM strength        []      []  []  []  [] Decrease Pelvic MM hypertonus      []      []  []  []  [x] Decrease Incontinence Episodes     []      []  [x]  []   [] Improve Voiding Habits         []      []  []  []  [] Decreased Urgency         []      []  []  []    Key Functional Changes:  improving urinary leakage episode      Non-Medicare, can change goals, can adjust or add frequency duration, no signature required      New Goals to be achieved in __4__ weeks  Patient will report independence with HEP to maximize therapeutic outcomes. PN: fair compliance 3-14-23     2. Pt will report not needing to change her underwear due to leakage to improve comfort with daily routine. PN: cont to have leakage with right after or 5 min leaving the bathroom 3-14-23      Frequency / Duration:   Patient to be seen   1   times per week for   4    weeks:    RECOMMENDATIONS: Pt had gap with PT due to waiting period to transfer to new location. She reports that she plateaus with her progression.  She has no leakage with any exercises classes & usual activities. She reports good compliance with core strengthening but fair compliance with PFM exercises (2 sets everyday). Pt cont to experience leakage post voiding, right after or 5 min afterwards. She cont to do repositioning to help empty completely. Fluid intake is about + oz daily. Focussed on PFM coordination, optimal voiding interval & HEP progression today, pt demonstrated good understanding. Also discussed benefit of NMES unit, pt will think over it & discuss with MD to obtain script as needed. Pt would cont to benefit from skilled Pelvic Floor PT to Increase pelvic floor muscle strength, Improve quality of pelvic floor contractions, Decrease resting tone of the pelvic floor, Increase tissue extensibility of the pelvic floor muscles, Increase core strength, Inhibit abnormal muscle activity, and Improve lumbosacral and coccygeal mobility in order to Increase urinary continence and Improve ability to perform ADLs. We would like to continue therapy for progress to goals stated above. Continue therapy with changes to Plan of Care to include: electrical stimulation    If you have any questions/comments please contact us directly. Thank you for allowing us to assist in the care of your patient.     Wilber Crane, PT       3/14/2023       3:19 PM

## 2023-03-14 NOTE — PROGRESS NOTES
PF DAILY TREATMENT NOTE (2023)      Patient Name: Melisa Copgood    Date: 3/14/2023    : 1985  Insurance: Payor: [employer] / Plan: [employer] / Product Type: *No Product type* /      Patient  verified yes     Visit #   Current / Total 2 6   Time   In / Out 11:04 12:05   Pain   In / Out 0/10 0/10   Subjective Functional Status/Changes: Pt reports leakage episode is less random but she notes some difficulty with incomplete voiding & some spasm with her pelvic region. Was instructed to do PFM exercise about 5x/day, everyday. Changes to:  Meds, Allergies, Med Hx, Sx Hx? If yes, update Summary List no       TREATMENT AREA =  [place] (stress urinary incontinence, female) [N39.3]      OBJECTIVE       38 min Therapeutic Activity:  []  See flow sheet :    []  Increase Tissue extensibility        [x]  Assess fiber intake    [x]  Assess voiding habits  [x]  Assess bowel habits  [x]  Other: voiding interval, HEP progression & update, NMES use, self MFR tech for aly-urethral muscles   Rationale: Increase pelvic floor muscle strength, Improve quality of pelvic floor contractions, Decrease resting tone of the pelvic floor, Increase tissue extensibility of the pelvic floor muscles, Increase core strength, Inhibit abnormal muscle activity, and Improve lumbosacral and coccygeal mobility in order to Increase urinary continence and Improve ability to perform ADLs.       23 min Neuromuscular Re-education:  []  See flow sheet :   []  Pelvic floor strengthening                 []  Pelvic floor downtraining  [x]  Quality pelvic floor contractions       []  Relaxation techniques  []  Urge suppression exercises  [x]  Other: PFM isolation, functional strengthening of PFM, voiding mechanics, core engagement  Rationale: Increase pelvic floor muscle strength, Improve quality of pelvic floor contractions, Decrease resting tone of the pelvic floor, Increase tissue extensibility of the pelvic floor muscles, Increase core strength, Inhibit abnormal muscle activity, and Improve lumbosacral and coccygeal mobility in order to Increase urinary continence and Improve ability to perform ADLs. With   [] TE   [] TA   [] neuro  [] manual   [] other: Patient Education: [x] Review HEP    [] Progressed/Changed HEP based on:   [] positioning   [] body mechanics   [] transfers   [] heat/ice application    [] other:      Other Objective/Functional Measures:   []baseline resting tone:   []slow twitch mms   []fast twitch mms    Pain Level (0-10 scale) post treatment: 0/10    ASSESSMENT/Changes in Function: see Progress Note please    []  Decrease # of leaks   [] No change []  Improving [] Resolved     []  Decrease hypertonus [] No change []  Improving [] Resolved     []  Increase void interval [] No change []  Improving [] Resolved     []  Increase PF strength [] No change []  Improving [] Resolved     []  Increase PF endurance [] No change []  Improving [] Resolved     []  Increase endurance [] No change []  Improving [] Resolved     []  Decrease # of pads [] No change []  Improving [] Resolved     []  Decrease pain [] No change []  Improving [] Resolved     []  Increased coordination [] No change []  Improving [] Resolved     []  Increased Bowel Frequency [] No change []  Improving [] Resolved       Patient will continue to benefit from skilled PT / OT services to modify and progress therapeutic interventions, analyze and address functional mobility deficits, analyze and address ROM deficits, analyze and address strength deficits, analyze and address soft tissue restrictions, analyze and cue for proper movement patterns, analyze and modify for postural abnormalities, analyze and address imbalance/dizziness, and instruct in home and community integration to address functional deficits and attain remaining goals.     []  See Plan of Care  [x]  See progress note/recertification  []  See Discharge Summary          Progress towards goals / Updated goals:   Patient will report independence with HEP to maximize therapeutic outcomes. PN: inconsistent with busy schedule. Trying fitness classes when able. 3-5 kegels 'here and there' throughout the day. Current: fair compliance 3-14-23     2. Pt will report not needing to change her underwear due to leakage to improve comfort with daily routine. PN: progressing, no underwear changing this week, 1 time last week.               Current: 1 episode of leakage this week (2/20/2023); cont to have leakage with right after or 5 min leaving the bathroom 3-14-23      PLAN  [x]  Upgrade activities as tolerated     [x]  Continue plan of care  []  Update interventions per flow sheet       []  Discharge due to:_  []  Other:_      Nirmala Thacker PT 3/14/2023  9:24 AM    Future Appointments   Date Time Provider Luis Ly   3/14/2023 11:00 AM Nirmala Thacker PT [id number]